# Patient Record
(demographics unavailable — no encounter records)

---

## 2025-04-24 NOTE — PHYSICAL EXAM
[de-identified] : Looks well no distress [de-identified] : Patient has 2 cm area of sentinel pore in the posterior superior aspect of her left lower extremity consistent with an indurated smoldering sebaceous cyst

## 2025-04-24 NOTE — ASSESSMENT
[FreeTextEntry1] : Patient has not a sebaceous cyst that has been infected and drained and now is becoming more painful I explained to the patient and her  about excision with underlying tissue to remove the actual wax producing hair follicle this could be done in our minor OR suite with local anesthesia  She will plan on getting this done next week in order to facilitate this and to sterilize the area I will start her on Augmentin which is safe in breast-feeding to be taken for 5 days prior to planned excision next week in minor OR

## 2025-04-24 NOTE — HISTORY OF PRESENT ILLNESS
[de-identified] : Patient is a 35-year-old woman otherwise healthy who is breast-feeding currently who began to notice pain and discoloration at the area of a previous ingrown hair on the posterior aspect of her left lower extremity over the calf muscle  She went to her dermatologist who put her on 2 weeks of cephalexin and incised it got some purulent material out however did not do a formal excision and the area is becoming more red and tender and the patient wishes this to be definitively treated  Is on no blood thinners  By report her pathology

## 2025-04-24 NOTE — REASON FOR VISIT
[Initial Evaluation] : an initial evaluation [FreeTextEntry1] : This is an initial visit for young woman who has a recurrent infection in the posterior aspect of her left lower extremity

## 2025-04-24 NOTE — PLAN
[FreeTextEntry1] : Plan for surgical excision minor OR next week  Spent 20 minutes with the patient today reviewing her options and examining her and documenting my findings

## 2025-05-08 NOTE — DATA REVIEWED
[FreeTextEntry1] :  A. SKIN AND SUBCUTIS, LEFT POSTERIOR LEG, EXCISION: Inflamed epidermal inclusion/ pilonidal cyst. Rupture with foreign body giant cell reaction. ICD CODES  L72.0 Pre-op Diagnosis: Left lower leg posterior excision cyst/lesion

## 2025-05-08 NOTE — PHYSICAL EXAM
[de-identified] : Looks well no complaints [de-identified] : Well-healed incision posterior aspect of leg with interrupted mattress sutures in place

## 2025-05-08 NOTE — ASSESSMENT
[FreeTextEntry1] : Patient had resection of an infected sebaceous cyst and the pathology held no surprises  Remove the sutures today and she will follow-up as needed

## 2025-05-08 NOTE — REASON FOR VISIT
[FreeTextEntry1] : Postop from recurrent infected cyst on posterior aspect left lower extremity 10 days ago

## 2025-05-08 NOTE — HISTORY OF PRESENT ILLNESS
[de-identified] : Doing well since surgery had a little bit of bleeding and discomfort but only took 1 Advil.  She is doing well showering wound looks well  Sutures are intact  No surrounding infection or fluid collection